# Patient Record
(demographics unavailable — no encounter records)

---

## 2025-03-07 NOTE — HISTORY OF PRESENT ILLNESS
[FreeTextEntry1] : Dr. Garnica, Thank you for referring her for cardiovascular evaluation.  She is a 70-year-old with a history of hypertension, hyperlipidemia and diabetes who has been noting some chest pains over the past year or so. She describes a sharp pain starting in her right lower chest that occurs sporadically with radiation across her chest below her breast that lasts for about 10 minutes and resolves after drinking water or spontaneously.  There were no associated symptoms during these episodes and no other radiation of the pain. She has not performed any routine aerobic Exercise over the past few years for no obvious reason. She has no known history of coronary artery disease, smoking or congestive heart failure. Her father did have a myocardial infarction at age 35. She does not take her losartan consistently. She does take rosuvastatin, Synthroid daily.  She stopped her metformin recently because her A1c was normal. Some exertional shortness of breath is noted though it is not very prominent.

## 2025-03-07 NOTE — DISCUSSION/SUMMARY
[FreeTextEntry1] : She is a 70-year-old with a history of diabetes, hypercholesterolemia and family history of premature coronary artery disease who presents with nonanginal chest pains. ECG shows normal sinus rhythm with no acute ST segment abnormalities. Chest pain: I reviewed the pathophysiology of cardiac induced chest pains and her symptoms are not consistent with this.  Some concern for GI/gallbladder issues were raised. Her exertional shortness of breath is likely due to deconditioning and I suggested that she begin routine aerobic activity of at least 30 minutes every other day. Echocardiography will exclude any structural heart disease though this is less likely. I encouraged her to follow-up with me if any new symptoms arise or her discomfort should become exertional. I also suggested consistent medication use as this is the best way to treat her medical issues. [EKG obtained to assist in diagnosis and management of assessed problem(s)] : EKG obtained to assist in diagnosis and management of assessed problem(s)

## 2025-03-24 NOTE — PLAN
[FreeTextEntry1] : Hypothyroidism  - Repeat TSH 1.25 - Continue on Synthroid 137mcg - F/u in 4 months   T2DM - Now A1c 6.1 while patient off Metformin  - Patient advised to continue dietary modification and exercise - Will continue to monitor off Metformin  - Repeat A1c in 4 months   Acute sinusitis  - Likely acute sinusitis given > 10 days of productive sputum and mild sinus pressure  - Ordered Augmentin 875mg BID x 10 days  - Patient to RTO if symptoms worsen or do not improve

## 2025-03-24 NOTE — HISTORY OF PRESENT ILLNESS
[de-identified] : Patient is a 69 yo female with PMHx of HTN, HLD, hypothyroidism presenting today for follow up.  Patient was on Synthroid 125mcg and on bloodwork had elevated TSH 38.60 and low free T4 0.7. Patient was increased to Synthroid 137mcg and was asked to do repeat blood work in 1 month. TSH now within normal limits 1.25. Patient states she is compliant with Synthroid 137mcg.  Of note, patient also endorses productive cough since the past 2 weeks. She states the mucus is yellow in color and also endorses mild sinus pressure. Denies any fever or chills.  All other ROS negative.

## 2025-03-24 NOTE — PHYSICAL EXAM
[No Acute Distress] : no acute distress [Well Nourished] : well nourished [Well Developed] : well developed [Well-Appearing] : well-appearing [EOMI] : extraocular movements intact [Supple] : supple [Thyroid Normal, No Nodules] : the thyroid was normal and there were no nodules present [No Respiratory Distress] : no respiratory distress  [No Accessory Muscle Use] : no accessory muscle use [Clear to Auscultation] : lungs were clear to auscultation bilaterally [Normal Rate] : normal rate  [Regular Rhythm] : with a regular rhythm [Normal S1, S2] : normal S1 and S2 [Soft] : abdomen soft [Non Tender] : non-tender [Non-distended] : non-distended [Normal Bowel Sounds] : normal bowel sounds [No Joint Swelling] : no joint swelling [No Rash] : no rash [No Focal Deficits] : no focal deficits [Alert and Oriented x3] : oriented to person, place, and time